# Patient Record
Sex: FEMALE | Race: OTHER
[De-identification: names, ages, dates, MRNs, and addresses within clinical notes are randomized per-mention and may not be internally consistent; named-entity substitution may affect disease eponyms.]

---

## 2018-11-05 ENCOUNTER — HOSPITAL ENCOUNTER (EMERGENCY)
Dept: HOSPITAL 80 - FED | Age: 23
Discharge: HOME | End: 2018-11-05
Payer: MEDICAID

## 2018-11-05 VITALS — SYSTOLIC BLOOD PRESSURE: 116 MMHG | DIASTOLIC BLOOD PRESSURE: 53 MMHG

## 2018-11-05 DIAGNOSIS — M79.89: ICD-10-CM

## 2018-11-05 DIAGNOSIS — T78.05XA: Primary | ICD-10-CM

## 2018-11-05 DIAGNOSIS — L29.9: ICD-10-CM

## 2018-11-05 DIAGNOSIS — J45.909: ICD-10-CM

## 2018-11-05 NOTE — EDPHY
H & P


Stated Complaint: allergic reaction to almonds





- Personal History


LMP (Females 10-55): 15-21 Days Ago


Current Tetanus Diphtheria and Acellular Pertussis (TDAP): Yes





- Medical/Surgical History


Hx Asthma: Yes


Hx Chronic Respiratory Disease: No


Hx Diabetes: No


Hx Cardiac Disease: No


Hx Renal Disease: No


Hx Cirrhosis: No


Hx Alcoholism: No


Hx HIV/AIDS: No


Hx Splenectomy or Spleen Trauma: No


Other PMH: asthma





- Social History


Smoking Status: Never smoked


Alcohol Use: Sober


Time Seen by Provider: 11/05/18 13:51


HPI/ROS: 





CHIEF COMPLAINT:  Anaphylaxis





HISTORY OF PRESENT ILLNESS:  23-year-old female presents with anaphylaxis.  She 

was eating almonds at 1100 and had immediate onset of facial swelling and 

itchiness.  Associated with throat itchiness and wheezing.  The wheezing is 

mild and she feels slightly short of breath.  No rash or dizziness.  No prior 

history of allergic reaction.  No medications taken prior to arrival.





REVIEW OF SYSTEMS:  complete 10 point ROS reviewed and is negative except for 

the noted elements in the HPI


 (Elina Garcia)





- Physical Exam


Exam: 





General Appearance:  Alert, no distress


Eyes:  Pupils equal and round, periorbital swelling present


ENT, Mouth:  Mucous membranes moist, no oral swelling


Neck:  Normal inspection, no stridor


Respiratory:  Lungs are clear to auscultation, diffuse expiratory wheezing


Cardiovascular:  Regular rate and rhythm


Neurological:  A&O, nonfocal, normal gait


Skin:  Normal inspection, no hives


Extremities:  No swelling


Psychiatric:  Mood and affect normal (Elina Garcia S)


Constitutional: 





 Initial Vital Signs











Temperature (C)  36.5 C   11/05/18 13:46


 


Heart Rate  63   11/05/18 13:46


 


Respiratory Rate  18   11/05/18 13:46


 


Blood Pressure  179/95 H  11/05/18 13:46


 


O2 Sat (%)  96   11/05/18 13:46








 











O2 Delivery Mode               Room Air














Allergies/Adverse Reactions: 


 





almonds Allergy (Uncoded 11/05/18 13:46)


 








Home Medications: 














 Medication  Instructions  Recorded


 


Albuterol  11/05/18


 


EPINEPHrine [Epipen 0.3 MG] 0.3 mg IM ONCE #2 syr 11/05/18


 


predniSONE 60 mg PO DAILY #9 tab 11/05/18














Medical Decision Making


ED Course/Re-evaluation: 





Epinephrine 0.3 IM, followed by Solu-Medrol, Zantac and Benadryl IV. A DuoNeb 

was given for bronchospasm.


1430: feels better, eyelid swelling resolving, throat sx resolved.  Chest CTA


1500: continues to feel better, drowsy.  Chest CTA.  Discharge instructions 

given by me.  Will f/u allergist.  Signed over to Dr. Ramon at shift change 

to observe pt until 1600.  If asymptomatic at 1600, plan is to discharge 

patient home. (Elina Garcia)





Re-evaluation by me at 4:10 p.m..  Patient is stable.  She feels her symptoms 

have resolved.  She does not feel that any symptoms are progressing.  She is 

speaking normally and in full sentences.  She is up to the bathroom.  She feels 

well to go home. (Christian Ramon)


Differential Diagnosis: 





Differential diagnosis includes though it is not limited to laryngeal edema, 

bronchospasm, hypotension, angioedema. (Elina Garcia)





- Data Points


Medications Given: 





 








Discontinued Medications





Albuterol/Ipratropium (Duoneb)  3 ml IH EDNOW ONE


   Stop: 11/05/18 13:59


   Last Admin: 11/05/18 13:56 Dose:  3 ml


Diphenhydramine HCl (Benadryl Injection)  25 mg IVP EDNOW ONE


   Stop: 11/05/18 13:52


   Last Admin: 11/05/18 13:58 Dose:  25 mg


Epinephrine HCl (Epinephrine)  0.3 mg IM EDNOW ONE


   Stop: 11/05/18 13:52


   Last Admin: 11/05/18 13:53 Dose:  0.3 mg


Methylprednisolone Sodium Succinate (Solu-Medrol)  125 mg IVP EDNOW ONE


   Stop: 11/05/18 13:52


   Last Admin: 11/05/18 13:58 Dose:  125 mg


Ranitidine HCl (Zantac)  50 mg IVP EDNOW ONE


   Stop: 11/05/18 13:52


   Last Admin: 11/05/18 13:58 Dose:  50 mg








Departure





- Departure


Disposition: Home, Routine, Self-Care


Clinical Impression: 


Acute anaphylaxis


Qualifiers:


 Encounter type: initial encounter Qualified Code(s): T78.2XXA - Anaphylactic 

shock, unspecified, initial encounter





Condition: Good


Instructions:  Anaphylaxis (ED)


Additional Instructions: 


Take Claritin in the morning and Benadryl at night while the rash persists.


Take prednisone as prescribed.


Return for worsening symptoms or any concerns.


Followup with an allergist.





Referrals: 


Enio Chadwick DO [Doctor of Osteopathy] - As per Instructions


Prescriptions: 


EPINEPHrine [Epipen 0.3 MG] 0.3 mg IM ONCE #2 syr


predniSONE 60 mg PO DAILY #9 tab

## 2019-03-19 ENCOUNTER — HOSPITAL ENCOUNTER (EMERGENCY)
Dept: HOSPITAL 80 - FED | Age: 24
LOS: 1 days | Discharge: HOME | End: 2019-03-20
Payer: MEDICAID

## 2019-03-19 DIAGNOSIS — F14.929: ICD-10-CM

## 2019-03-19 DIAGNOSIS — F10.920: Primary | ICD-10-CM

## 2019-03-19 NOTE — EDPHY
H & P


Stated Complaint: ETOH intox and cocaine


Time Seen by Provider: 03/19/19 22:03


HPI/ROS: 





Chief Complaint:  Alcohol intoxication, cocaine ingestion





HPI:  24-year-old female who was found at the [facility] intoxicated.  Patient 

passed out after taking alcohol and using cocaine. Is unable to ambulate on 

their own.  Patient brought in by EMS for further evaluation.  Is unresponsive 

around.  Tolerated a nasopharyngeal airway without any difficulty.  No obvious 

signs of trauma per EMS.  Remainder of history is unobtainable secondary to the 

patient's intoxication.





ROS:  Unobtainable secondary to the patient's intoxication





PMH:  Unknown


Medications:  Unknown


Allergies:  Unknown





Social History:  Positive for alcohol





Family History: non-contributory





Physical Exam:


Gen:  Somnolent, responds to painful stimuli, maintaining airway, smells of 

alcohol and emesis


HEENT:  Atraumatic


     Nose: no epistaxis or deformity


     Eyes: PERRLA, EOMI


     Mouth: Moist mucosa 


Neck: Supple, no step-offs or deformity


Chest:  Atraumatic, lungs clear to auscultation


Heart: S1, S2 normal, no murmur


Abd: Soft, non-tender, no guarding


Back:  Atraumatic


Ext: no edema, atraumatic


Skin: no rash


Neuro:  Sensation grossly intact, Strength 5/5 in bilateral upper and lower 

extremities





- Personal History


LMP (Females 10-55): Unknown


Current Tetanus/Diphtheria Vaccine: Yes





- Medical/Surgical History


Hx Asthma: Yes


Hx Chronic Respiratory Disease: No


Hx Diabetes: No


Hx Cardiac Disease: No


Hx Renal Disease: No


Hx Cirrhosis: No


Hx Alcoholism: No


Hx HIV/AIDS: No


Hx Splenectomy or Spleen Trauma: No


Other PMH: asthma





- Social History


Smoking Status: Never smoked


Constitutional: 


 Initial Vital Signs











Temperature (C)  36.8 C   03/19/19 22:03


 


Heart Rate  97   03/19/19 22:03


 


Respiratory Rate  16   03/19/19 22:03


 


Blood Pressure  100/81 H  03/19/19 22:03


 


O2 Sat (%)  97   03/19/19 22:03








 











O2 Delivery Mode               Room Air














Allergies/Adverse Reactions: 


 





almonds Allergy (Uncoded 11/05/18 13:46)


 








Home Medications: 














 Medication  Instructions  Recorded


 


Albuterol  11/05/18


 


Albuterol Hfa Anes Only [Proair 2 puffs IH QID PRN #1 mdi 11/05/18





Hfa Icu (*)]  


 


EPINEPHrine [Epipen 0.3 MG] 0.3 mg IM ONCE #2 syr 11/05/18


 


predniSONE 60 mg PO DAILY #9 tab 11/05/18














Medical Decision Making


ED Course/Re-evaluation: 





Patient is now awake and appropriate.  Ambulating unassisted to the bathroom.  

No current complaints.


Patient is tolerating oral fluids.  Patient is ready for discharge with sober 

ride.





Departure





- Departure


Disposition: Home, Routine, Self-Care


Clinical Impression: 


 Alcoholic intoxication





Condition: Good


Instructions:  Alcohol Intoxication (ED)


Referrals: 


Patient,NotPresent [Unknown] - As per Instructions

## 2019-03-20 VITALS — SYSTOLIC BLOOD PRESSURE: 110 MMHG | DIASTOLIC BLOOD PRESSURE: 72 MMHG
